# Patient Record
Sex: FEMALE | Race: WHITE | Employment: OTHER | URBAN - METROPOLITAN AREA
[De-identification: names, ages, dates, MRNs, and addresses within clinical notes are randomized per-mention and may not be internally consistent; named-entity substitution may affect disease eponyms.]

---

## 2024-10-24 ENCOUNTER — OFFICE VISIT (OUTPATIENT)
Dept: URGENT CARE | Age: 79
End: 2024-10-24
Payer: MEDICARE

## 2024-10-24 ENCOUNTER — ANCILLARY PROCEDURE (OUTPATIENT)
Dept: URGENT CARE | Age: 79
End: 2024-10-24
Payer: MEDICARE

## 2024-10-24 VITALS
HEIGHT: 60 IN | SYSTOLIC BLOOD PRESSURE: 197 MMHG | DIASTOLIC BLOOD PRESSURE: 114 MMHG | WEIGHT: 230 LBS | HEART RATE: 64 BPM | RESPIRATION RATE: 18 BRPM | BODY MASS INDEX: 45.16 KG/M2 | OXYGEN SATURATION: 97 % | TEMPERATURE: 97.3 F

## 2024-10-24 DIAGNOSIS — M25.551 PAIN OF RIGHT HIP: ICD-10-CM

## 2024-10-24 DIAGNOSIS — M25.551 PAIN OF RIGHT HIP: Primary | ICD-10-CM

## 2024-10-24 PROCEDURE — 72170 X-RAY EXAM OF PELVIS: CPT | Performed by: FAMILY MEDICINE

## 2024-10-24 RX ORDER — ATENOLOL 25 MG/1
25 TABLET ORAL
COMMUNITY
Start: 2024-08-20

## 2024-10-24 RX ORDER — ASPIRIN 81 MG/1
81 TABLET ORAL DAILY
COMMUNITY

## 2024-10-24 RX ORDER — LEVOTHYROXINE SODIUM 88 UG/1
88 TABLET ORAL
COMMUNITY
Start: 2024-08-20

## 2024-10-24 RX ORDER — ALENDRONATE SODIUM 70 MG/1
70 TABLET ORAL WEEKLY
COMMUNITY
Start: 2023-11-06

## 2024-10-24 RX ORDER — LORAZEPAM 0.5 MG/1
TABLET ORAL
COMMUNITY
Start: 2024-02-22

## 2024-10-24 RX ORDER — ATORVASTATIN CALCIUM 10 MG/1
10 TABLET, FILM COATED ORAL
COMMUNITY
Start: 2024-08-20

## 2024-10-24 RX ORDER — DICLOFENAC SODIUM 10 MG/G
4 GEL TOPICAL 3 TIMES DAILY PRN
Qty: 100 G | Refills: 0 | Status: SHIPPED | OUTPATIENT
Start: 2024-10-24 | End: 2024-10-31

## 2024-10-24 NOTE — PROGRESS NOTES
Subjective   Patient ID: Shiela Carranza is a 79 y.o. female. They present today with a chief complaint of Other (Right side pain near hip that radiates down to knee. ).    Patient disposition: Home    History of Present Illness  HPI  Patient was seen at PCP 5 days ago with right sided back pain with radiation.  Patient was diagnosed with right-sided sciatica and placed on Medrol Dosepak and tramadol.  Patient with history of left-sided sciatica in the past and on the right side is feeling similar.  No significant improvement with the medication.  Patient with very elevated blood pressure at that time, advised to follow-up after monitoring at home as well.  Recommended exercise program.  Initial injury was from pulling a heavy bin and lifting it.  Has been using a wheeled walker for support lately.  Accompanied by daughter today.  PCP advised here for further evaluation      Past Medical History  Allergies as of 10/24/2024 - Reviewed 10/24/2024   Allergen Reaction Noted    Penicillins Unknown 10/24/2024       (Not in a hospital admission)            reports that she has never smoked. She has never used smokeless tobacco.    Review of Systems  As noted in HPI. ROS otherwise negative unless noted.       Objective    Vitals:    10/24/24 1826   BP: (!) 197/114   BP Location: Left arm   Patient Position: Sitting   Pulse: 64   Resp: 18   Temp: 36.3 °C (97.3 °F)   TempSrc: Oral   SpO2: 97%   Weight: 104 kg (230 lb)   Height: 1.524 m (5')     No LMP recorded.    Physical Exam  Constitutional: vital signs reviewed. Well developed, well nourished. patient alert and patient without distress.   Psych: Normal mood and affect  Head and Face: Normal and atraumatic.    Cardiovascular: Heart rate normal, normal S1 and S2, no gallops, no murmurs and no pericardial rub. Rhythm: Normal.  Pulmonary: No respiratory distress. Palpation of chest: Normal. Clear bilateral breath sounds.   Skin: Normal skin color and pigmentation, normal skin  turgor, and no rash.  MSK: Nontender thoracic or lumbar spine.  Nontender SI joint.  Right greater peritrochanteric tender.  No impingement signs.          Procedures    Point of Care Test & Imaging Results from this visit  No results found for this or any previous visit.       Diagnostic study results (if any) were reviewed.    Assessment/Plan   Allergies, medications, history, and pertinent labs/EKGs/Imaging reviewed.    Medical Decision Making  See note    Orders and Diagnoses  There are no diagnoses linked to this encounter.    Medical Admin Record      Follow Up Instructions  No follow-ups on file.      Electronically signed by [facility]

## 2024-10-24 NOTE — PATIENT INSTRUCTIONS
You had x-rays taken. Your x-ray reading is an initial interpretation. It will be further reviewed by a radiologist. If further treatment is necessary, you will be notified by the urgent care.    Use a topical anti-inflammatory medication as directed.  Tylenol is accepted to use.    Heating pad to the area    Your blood pressure reading was elevated here today in the office.  Continue to check this at home after you have rested for a few minutes.  If it continues to remain elevated, follow-up sooner.